# Patient Record
Sex: MALE | Employment: OTHER | URBAN - METROPOLITAN AREA
[De-identification: names, ages, dates, MRNs, and addresses within clinical notes are randomized per-mention and may not be internally consistent; named-entity substitution may affect disease eponyms.]

---

## 2020-06-24 ENCOUNTER — ANESTHESIA EVENT (OUTPATIENT)
Dept: ENDOSCOPY | Age: 70
End: 2020-06-24
Payer: MEDICARE

## 2020-06-25 ENCOUNTER — HOSPITAL ENCOUNTER (OUTPATIENT)
Age: 70
Setting detail: OUTPATIENT SURGERY
Discharge: HOME | End: 2020-06-25
Attending: INTERNAL MEDICINE | Admitting: INTERNAL MEDICINE
Payer: MEDICARE

## 2020-06-25 ENCOUNTER — ANESTHESIA (OUTPATIENT)
Dept: ENDOSCOPY | Age: 70
End: 2020-06-25
Payer: MEDICARE

## 2020-06-25 VITALS
SYSTOLIC BLOOD PRESSURE: 100 MMHG | HEART RATE: 67 BPM | DIASTOLIC BLOOD PRESSURE: 68 MMHG | TEMPERATURE: 97.6 F | OXYGEN SATURATION: 99 % | RESPIRATION RATE: 15 BRPM

## 2020-06-25 PROCEDURE — 76040000019: Performed by: INTERNAL MEDICINE

## 2020-06-25 PROCEDURE — 74011250637 HC RX REV CODE- 250/637: Performed by: NURSE ANESTHETIST, CERTIFIED REGISTERED

## 2020-06-25 PROCEDURE — 74011250636 HC RX REV CODE- 250/636: Performed by: ANESTHESIOLOGY

## 2020-06-25 PROCEDURE — 74011250636 HC RX REV CODE- 250/636: Performed by: NURSE ANESTHETIST, CERTIFIED REGISTERED

## 2020-06-25 PROCEDURE — 76060000031 HC ANESTHESIA FIRST 0.5 HR: Performed by: INTERNAL MEDICINE

## 2020-06-25 PROCEDURE — 88305 TISSUE EXAM BY PATHOLOGIST: CPT

## 2020-06-25 PROCEDURE — 77030013992 HC SNR POLYP ENDOSC BSC -B: Performed by: INTERNAL MEDICINE

## 2020-06-25 RX ORDER — EZETIMIBE 10 MG/1
TABLET ORAL DAILY
COMMUNITY

## 2020-06-25 RX ORDER — LOVASTATIN 40 MG/1
40 TABLET ORAL DAILY
COMMUNITY

## 2020-06-25 RX ORDER — INSULIN LISPRO 100 [IU]/ML
INJECTION, SOLUTION INTRAVENOUS; SUBCUTANEOUS ONCE
Status: DISCONTINUED | OUTPATIENT
Start: 2020-06-25 | End: 2020-06-25 | Stop reason: HOSPADM

## 2020-06-25 RX ORDER — LEVOTHYROXINE SODIUM 50 UG/1
TABLET ORAL
COMMUNITY

## 2020-06-25 RX ORDER — OMEPRAZOLE 20 MG/1
20 CAPSULE, DELAYED RELEASE ORAL DAILY
COMMUNITY

## 2020-06-25 RX ORDER — FAMOTIDINE 20 MG/1
20 TABLET, FILM COATED ORAL ONCE
Status: COMPLETED | OUTPATIENT
Start: 2020-06-25 | End: 2020-06-25

## 2020-06-25 RX ORDER — PROPOFOL 10 MG/ML
VIAL (ML) INTRAVENOUS AS NEEDED
Status: DISCONTINUED | OUTPATIENT
Start: 2020-06-25 | End: 2020-06-25 | Stop reason: HOSPADM

## 2020-06-25 RX ORDER — SODIUM CHLORIDE, SODIUM LACTATE, POTASSIUM CHLORIDE, CALCIUM CHLORIDE 600; 310; 30; 20 MG/100ML; MG/100ML; MG/100ML; MG/100ML
50 INJECTION, SOLUTION INTRAVENOUS CONTINUOUS
Status: DISCONTINUED | OUTPATIENT
Start: 2020-06-25 | End: 2020-06-25 | Stop reason: HOSPADM

## 2020-06-25 RX ADMIN — PROPOFOL 50 MG: 10 INJECTION, EMULSION INTRAVENOUS at 10:15

## 2020-06-25 RX ADMIN — FAMOTIDINE 20 MG: 20 TABLET ORAL at 10:00

## 2020-06-25 RX ADMIN — PROPOFOL 200 MG: 10 INJECTION, EMULSION INTRAVENOUS at 10:07

## 2020-06-25 RX ADMIN — SODIUM CHLORIDE, SODIUM LACTATE, POTASSIUM CHLORIDE, AND CALCIUM CHLORIDE 50 ML/HR: 600; 310; 30; 20 INJECTION, SOLUTION INTRAVENOUS at 09:40

## 2020-06-25 NOTE — ANESTHESIA POSTPROCEDURE EVALUATION
Procedure(s):  COLONOSCOPY w/polypectomies. MAC    Anesthesia Post Evaluation      Multimodal analgesia: multimodal analgesia used between 6 hours prior to anesthesia start to PACU discharge  Patient location during evaluation: bedside  Patient participation: complete - patient cannot participate  Level of consciousness: awake  Pain score: 1  Pain management: adequate  Airway patency: patent  Anesthetic complications: no  Cardiovascular status: acceptable  Respiratory status: acceptable  Hydration status: acceptable  Post anesthesia nausea and vomiting:  none      INITIAL Post-op Vital signs: No vitals data found for the desired time range.

## 2020-06-25 NOTE — PROCEDURES
Barbara  [facility], [address]      Brief Procedure Note    Lucila Vigil  1950  [id number]    Date of Procedure: 6/25/2020    Preoperative diagnosis: Personal history of colonic polyps [Z86.010]    Postoperative diagnosis:  colon polyps ((proxmal transverse, descending); hemorrhoids     Type of Anesthesia: MAC (monitered anesthesia care)    Description of Findings: same as post op dx    Procedure: Procedure(s):  COLONOSCOPY w/polypectomies    :  Dr. Marcie Hartman MD    Assistant(s): [unfilled]    Type of Anesthesia:MAC     EBL:None, no implants.     Specimens:   ID Type Source Tests Collected by Time Destination   1 : proximal transverse polyp Preservative Colon  Bob Castillo MD 6/25/2020 1013 Pathology   2 : descending polyp  Preservative Colon  Bob Castillo MD 6/25/2020 1016 Pathology       Findings: See printed and scanned procedure note    Complications: None    Dr. Marcie Hartman MD  6/25/2020  10:24 AM

## 2020-06-25 NOTE — H&P
Chief Complaint: Personal history of polyps. History of present illness: No complaints. PMH: History reviewed. No pertinent past medical history. Allergies: No Known Allergies  Medications:   Current Facility-Administered Medications:     lactated Ringers infusion, 50 mL/hr, IntraVENous, CONTINUOUS, Diana Mars CRNA, Last Rate: 50 mL/hr at 06/25/20 0940, 50 mL/hr at 06/25/20 0940    [COMPLETED] famotidine (PEPCID) tablet 20 mg, 20 mg, Oral, ONCE, Diana Mars CRNA, 20 mg at 06/25/20 1000    insulin lispro (HUMALOG) injection, , SubCUTAneous, ONCE, Diana Mars CRNA    [COMPLETED] Need ALLERGY information, 1 Each, Other, NOW, Giancarlo Jordan MD, 1 Each at 06/25/20 1000  FH: History reviewed. No pertinent family history. Social:   Social History     Socioeconomic History    Marital status:      Spouse name: Not on file    Number of children: Not on file    Years of education: Not on file    Highest education level: Not on file     Surgical H: History reviewed. No pertinent surgical history. ROS: negative    Physical Exam:   Visit Vitals  /86   Pulse 71   Temp 97.6 °F (36.4 °C)   Resp 14   SpO2 100%     General appearance: alert, no distress  Eyes: pupils equal and reactive, extraocular eye movements intact  Nodes: No gross adenopathy in neck. Skin: no spider angiomata, jaundice, palmar erythema   Respiratory: clear to auscultation bilaterally  Cardiovascular: regular heart rate, no murmurs, no JVD, normal rate and regular rhythm  Abdomen: soft, non-tender, liver not enlarged, spleen not palpable, no obvious ascites  Extremities: no muscle wasting, no gross arthritic changes  Neurologic: alert and oriented, cranial nerves grossly intact, no asterixis    Labs: No results found for this or any previous visit (from the past 24 hour(s)). Imp/ Plan: Will proceed with colonoscopy as planned.  Risk benefits alternative including but not limited to infection, bleeding, perforation of viscous, allergic reaction and resultant morbidity and mortality was discussed. Chance of missing a significant lesion due to various reasons were discussed.       Keya Gold MD  [facility], [place]mani Gerardo

## 2020-06-25 NOTE — DISCHARGE INSTRUCTIONS
Patient Education        Hemorrhoids: Care Instructions  Your Care Instructions     Hemorrhoids are enlarged veins that develop in the anal canal. Bleeding during bowel movements, itching, swelling, and rectal pain are the most common symptoms. They can be uncomfortable at times, but hemorrhoids rarely are a serious problem. You can treat most hemorrhoids with simple changes to your diet and bowel habits. These changes include eating more fiber and not straining to pass stools. Most hemorrhoids do not need surgery or other treatment unless they are very large and painful or bleed a lot. Follow-up care is a key part of your treatment and safety. Be sure to make and go to all appointments, and call your doctor if you are having problems. It's also a good idea to know your test results and keep a list of the medicines you take. How can you care for yourself at home? · Sit in a few inches of warm water (sitz bath) 3 times a day and after bowel movements. The warm water helps with pain and itching. · Put ice on your anal area several times a day for 10 minutes at a time. Put a thin cloth between the ice and your skin. Follow this by placing a warm, wet towel on the area for another 10 to 20 minutes. · Take pain medicines exactly as directed. ? If the doctor gave you a prescription medicine for pain, take it as prescribed. ? If you are not taking a prescription pain medicine, ask your doctor if you can take an over-the-counter medicine. · Keep the anal area clean, but be gentle. Use water and a fragrance-free soap, such as [place], or use baby wipes or medicated pads, such as Tucks. · Wear cotton underwear and loose clothing to decrease moisture in the anal area. · Eat more fiber. Include foods such as whole-grain breads and cereals, raw vegetables, raw and dried fruits, and beans. · Drink plenty of fluids, enough so that your urine is light yellow or clear like water.  If you have kidney, heart, or liver disease and have to limit fluids, talk with your doctor before you increase the amount of fluids you drink. · Use a stool softener that contains bran or psyllium. You can save money by buying bran or psyllium (available in bulk at most health food stores) and sprinkling it on foods or stirring it into fruit juice. Or you can use a product such as Metamucil or Hydrocil. · Practice healthy bowel habits. ? Go to the bathroom as soon as you have the urge. ? Avoid straining to pass stools. Relax and give yourself time to let things happen naturally. ? Do not hold your breath while passing stools. ? Do not read while sitting on the toilet. Get off the toilet as soon as you have finished. · Take your medicines exactly as prescribed. Call your doctor if you think you are having a problem with your medicine. When should you call for help? [id number] anytime you think you may need emergency care. For example, call if:  · You pass maroon or very bloody stools. Call your doctor now or seek immediate medical care if:  · You have increased pain. · You have increased bleeding. Watch closely for changes in your health, and be sure to contact your doctor if:  · Your symptoms have not improved after 3 or 4 days. Where can you learn more? Go to http://miriam-shannan.info/  Enter F228 in the search box to learn more about \"Hemorrhoids: Care Instructions. \"  Current as of: August 12, 2019               Content Version: 12.5  © 3946-7075 Healthwise, Incorporated. Care instructions adapted under license by [employer] (which disclaims liability or warranty for this information). If you have questions about a medical condition or this instruction, always ask your healthcare professional. [place] [address] any warranty or liability for your use of this information.        Colonoscopy: What to Expect at [address] [place] [address]  After you have a colonoscopy, you will stay at the clinic for 1 to 2 hours until the medicines wear off. Then you can go home. But you will need to arrange for a ride. Your doctor will tell you when you can eat and do your other usual activities. Your doctor will talk to you about when you will need your next colonoscopy. Your doctor can help you decide how often you need to be checked. This will depend on the results of your test and your risk for colorectal cancer. After the test, you may be bloated or have gas pains. You may need to pass gas. If a biopsy was done or a polyp was removed, you may have streaks of blood in your stool (feces) for a few days. This care sheet gives you a general idea about how long it will take for you to recover. But each person recovers at a different pace. Follow the steps below to get better as quickly as possible. How can you care for yourself at home? Activity  · Rest when you feel tired. · You can do your normal activities when it feels okay to do so. Diet  · Follow your doctor's directions for eating. · Unless your doctor has told you not to, drink plenty of fluids. This helps to replace the fluids that were lost during the colon prep. · Do not drink alcohol. Medicines  · If polyps were removed or a biopsy was done during the test, your doctor may tell you not to take aspirin or other anti-inflammatory medicines for a few days. These include ibuprofen (Advil, Motrin) and naproxen (Aleve). Other instructions  · For your safety, do not drive or operate machinery until the medicine wears off and you can think clearly. Your doctor may tell you not to drive or operate machinery until the day after your test.  · Do not sign legal documents or make major decisions until the medicine wears off and you can think clearly. The anesthesia can make it hard for you to fully understand what you are agreeing to. Follow-up care is a key part of your treatment and safety.  Be sure to make and go to all appointments, and call your doctor if you are having problems. It's also a good idea to know your test results and keep a list of the medicines you take. When should you call for help? Call 911 anytime you think you may need emergency care. For example, call if:  · You passed out (lost consciousness). · You pass maroon or bloody stools. · You have severe belly pain. Call your doctor now or seek immediate medical care if:  · Your stools are black and tarlike. · Your stools have streaks of blood, but you did not have a biopsy or any polyps removed. · You have belly pain, or your belly is swollen and firm. · You vomit. · You have a fever. · You are very dizzy. Watch closely for changes in your health, and be sure to contact your doctor if you have any problems. Where can you learn more? Go to Spor[employer]be  Enter E264 in the search box to learn more about \"Colonoscopy: What to Expect at Home. \"   © 6012-8747 Healthwise, Incorporated. Care instructions adapted under license by [address] [place] [address] [employer] (which disclaims liability or warranty for this information). This care instruction is for use with your licensed healthcare professional. If you have questions about a medical condition or this instruction, always ask your healthcare professional. [place] [address] any warranty or liability for your use of this information. Content Version: 05.1.680130; Current as of: November 14, 2014      DISCHARGE SUMMARY from Nurse     POST-PROCEDURE INSTRUCTIONS:    Call your Physician if you:  ? Observe any excess bleeding. ? Develop a temperature over 100.5o F.  ? Experience abdominal, shoulder or chest pain. ? Notice any signs of decreased circulation or nerve impairment to an extremity such as a change in color, persistent numbness, tingling, coldness or increase in pain. ? Vomit blood or you have nausea and vomiting lasting longer than 4 hours. ? Are unable to take medications.   ? Are unable to urinate within 8 hours after discharge following general anesthesia or intravenous sedation. For the next 24 hours after receiving general anesthesia or intravenous sedation, or while taking prescription Narcotics, limit your activities:  ? Do NOT drive a motor vehicle, operate hazard machinery or power tools, or perform tasks that require coordination. The medication you received during your procedure may have some effect on your mental awareness. ? Do NOT make important personal or business decisions. The medication you received during your procedure may have some effect on your mental awareness. ? Do NOT drink alcoholic beverages. These drinks do not mix well with the medications that have been given to you. ? Upon discharge from the hospital, you must be accompanied by a responsible adult. ? Resume your diet as directed by your physician. ? Resume medications as your physician has prescribed. ? Please give a list of your current medications to your Primary Care Provider. ? Please update this list whenever your medications are discontinued, doses are changed, or new medications (including over-the-counter products) are added. ? Please carry medication information at all times in case of emergency situations. These are general instructions for a healthy lifestyle:    No smoking/ No tobacco products/ Avoid exposure to second hand smoke.  Surgeon General's Warning:  Quitting smoking now greatly reduces serious risk to your health. Obesity, smoking, and a sedentary lifestyle greatly increase your risk for illness.  A healthy diet, regular physical exercise & weight monitoring are important for maintaining a healthy lifestyle   You may be retaining fluid if you have a history of heart failure or if you experience any of the following symptoms:  Weight gain of 3 pounds or more overnight or 5 pounds in a week, increased swelling in our hands or feet or shortness of breath while lying flat in bed.   Please call your doctor as soon as you notice any of these symptoms; do not wait until your next office visit. Recognize signs and symptoms of STROKE:  F  -  Face looks uneven  A  -  Arms unable to move or move unevenly  S  -  Speech slurred or non-existent  T  -  Time to call 911 - as soon as signs and symptoms begin - DO NOT go back to bed or wait to see If you get better - TIME IS BRAIN. Colorectal Screening   Colorectal cancer almost always develops from precancerous polyps (abnormal growths) in the colon or rectum. Screening tests can find precancerous polyps, so that they can be removed before they turn into cancer. Screening tests can also find colorectal cancer early, when treatment works best.  Sunitha Pacheco Speak with your physician about when you should begin screening and how often you should be tested. Additional Information    If you have questions, please call [phone]. Remember, [email or web address] is NOT to be used for urgent needs. For medical emergencies, dial 911. Educational references and/or instructions provided during this visit included:    see attachments      APPOINTMENTS:    Please make a follow-up appointment with your physician. Discharge information has been reviewed with the patient and responsible party. The patient and responsible party verbalized understanding. Patient Education        Colon Polyps: Care Instructions  Your Care Instructions     Colon polyps are growths in the colon or the rectum. The cause of most colon polyps is not known, and most people who get them do not have any problems. But a certain kind can turn into cancer. For this reason, regular testing for colon polyps is important for people as they get older. It is also important for anyone who has an increased risk for colon cancer. Polyps are usually found through routine colon cancer screening tests. Although most colon polyps are not cancerous, they are usually removed and then tested for cancer.  Screening for colon cancer saves lives because the cancer can usually be cured if it is caught early. If you have a polyp that is the type that can turn into cancer, you may need more tests to examine your entire colon. The doctor will remove any other polyps that he or she finds, and you will be tested more often. Follow-up care is a key part of your treatment and safety. Be sure to make and go to all appointments, and call your doctor if you are having problems. It's also a good idea to know your test results and keep a list of the medicines you take. How can you care for yourself at home? Regular exams to look for colon polyps are the best way to prevent polyps from turning into colon cancer. These can include stool tests, sigmoidoscopy, colonoscopy, and CT colonography. Talk with your doctor about a testing schedule that is right for you. To prevent polyps  There is no home treatment that can prevent colon polyps. But these steps may help lower your risk for cancer. · Stay active. Being active can help you get to and stay at a healthy weight. Try to exercise on most days of the week. Walking is a good choice. · Eat well. Choose a variety of vegetables, fruits, legumes (such as peas and beans), fish, poultry, and whole grains. · Do not smoke. If you need help quitting, talk to your doctor about stop-smoking programs and medicines. These can increase your chances of quitting for good. · If you drink alcohol, limit how much you drink. Limit alcohol to 2 drinks a day for men and 1 drink a day for women. When should you call for help? Call your doctor now or seek immediate medical care if:  · You have severe belly pain. · Your stools are maroon or very bloody. Watch closely for changes in your health, and be sure to contact your doctor if:  · You have a fever. · You have nausea or vomiting. · You have a change in bowel habits (new constipation or diarrhea). · Your symptoms get worse or are not improving as expected.   Where can you learn more? Go to http://miriam-shannan.info/  Enter C571 in the search box to learn more about \"Colon Polyps: Care Instructions. \"  Current as of: August 22, 2019               Content Version: 12.5  © 1223-6886 Healthwise, Incorporated. Care instructions adapted under license by [employer] (which disclaims liability or warranty for this information). If you have questions about a medical condition or this instruction, always ask your healthcare professional. [address] any warranty or liability for your use of this information.

## 2025-06-12 NOTE — ANESTHESIA PREPROCEDURE EVALUATION
Relevant Problems   No relevant active problems       Anesthetic History   No history of anesthetic complications            Review of Systems / Medical History  Patient summary reviewed, nursing notes reviewed and pertinent labs reviewed    Pulmonary  Within defined limits                 Neuro/Psych   Within defined limits           Cardiovascular  Within defined limits                     GI/Hepatic/Renal  Within defined limits              Endo/Other  Within defined limits           Other Findings              Physical Exam    Airway  Mallampati: II  TM Distance: 4 - 6 cm  Neck ROM: normal range of motion   Mouth opening: Normal     Cardiovascular  Regular rate and rhythm,  S1 and S2 normal,  no murmur, click, rub, or gallop  Rhythm: regular  Rate: normal         Dental  No notable dental hx       Pulmonary  Breath sounds clear to auscultation               Abdominal  GI exam deferred       Other Findings            Anesthetic Plan    ASA: 2  Anesthesia type: MAC          Induction: Intravenous  Anesthetic plan and risks discussed with: Patient 12-Jun-2025 18:11

## (undated) DEVICE — CATH IV SAFE STR 22GX1IN BLU -- PROTECTIV PLUS

## (undated) DEVICE — TRAP SPEC COLL POLYP POLYSTYR --

## (undated) DEVICE — SNARE POLYP M W27MMXL240CM OVL STIFF DISP CAPTIVATOR

## (undated) DEVICE — Device